# Patient Record
Sex: FEMALE | Race: ASIAN | NOT HISPANIC OR LATINO | Employment: PART TIME
[De-identification: names, ages, dates, MRNs, and addresses within clinical notes are randomized per-mention and may not be internally consistent; named-entity substitution may affect disease eponyms.]

---
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
## 2022-05-10 ENCOUNTER — APPOINTMENT (OUTPATIENT)
Dept: ULTRASOUND IMAGING | Facility: CLINIC | Age: 22
End: 2022-05-10
Attending: EMERGENCY MEDICINE
Payer: MEDICAID

## 2022-05-10 ENCOUNTER — HOSPITAL ENCOUNTER (EMERGENCY)
Facility: CLINIC | Age: 22
Discharge: HOME | End: 2022-05-11
Attending: EMERGENCY MEDICINE | Admitting: EMERGENCY MEDICINE
Payer: MEDICAID

## 2022-05-10 DIAGNOSIS — O20.9 VAGINAL BLEEDING IN PREGNANCY, FIRST TRIMESTER: ICD-10-CM

## 2022-05-10 LAB
ABO/RH(D): NORMAL
ALBUMIN UR-MCNC: NEGATIVE MG/DL
ANION GAP SERPL CALCULATED.3IONS-SCNC: 12 MMOL/L (ref 5–18)
APPEARANCE UR: CLEAR
BILIRUB UR QL STRIP: NEGATIVE
BUN SERPL-MCNC: 10 MG/DL (ref 8–22)
CALCIUM SERPL-MCNC: 9.5 MG/DL (ref 8.5–10.5)
CHLORIDE BLD-SCNC: 105 MMOL/L (ref 98–107)
CO2 SERPL-SCNC: 22 MMOL/L (ref 22–31)
COLOR UR AUTO: COLORLESS
CREAT SERPL-MCNC: 0.69 MG/DL (ref 0.6–1.1)
ERYTHROCYTE [DISTWIDTH] IN BLOOD BY AUTOMATED COUNT: 12.3 % (ref 10–15)
GFR SERPL CREATININE-BSD FRML MDRD: >90 ML/MIN/1.73M2
GLUCOSE BLD-MCNC: 87 MG/DL (ref 70–125)
GLUCOSE UR STRIP-MCNC: NEGATIVE MG/DL
HCG SERPL-ACNC: 140 MLU/ML (ref 0–4)
HCT VFR BLD AUTO: 37.5 % (ref 35–47)
HGB BLD-MCNC: 12.8 G/DL (ref 11.7–15.7)
HGB UR QL STRIP: ABNORMAL
HOLD SPECIMEN: NORMAL
KETONES UR STRIP-MCNC: NEGATIVE MG/DL
LEUKOCYTE ESTERASE UR QL STRIP: NEGATIVE
MCH RBC QN AUTO: 29.4 PG (ref 26.5–33)
MCHC RBC AUTO-ENTMCNC: 34.1 G/DL (ref 31.5–36.5)
MCV RBC AUTO: 86 FL (ref 78–100)
NITRATE UR QL: NEGATIVE
PH UR STRIP: 6.5 [PH] (ref 5–7)
PLATELET # BLD AUTO: 369 10E3/UL (ref 150–450)
POTASSIUM BLD-SCNC: 3.7 MMOL/L (ref 3.5–5)
RBC # BLD AUTO: 4.35 10E6/UL (ref 3.8–5.2)
RBC URINE: 1 /HPF
SODIUM SERPL-SCNC: 139 MMOL/L (ref 136–145)
SP GR UR STRIP: 1 (ref 1–1.03)
SPECIMEN EXPIRATION DATE: NORMAL
SQUAMOUS EPITHELIAL: 1 /HPF
UROBILINOGEN UR STRIP-MCNC: <2 MG/DL
WBC # BLD AUTO: 8.1 10E3/UL (ref 4–11)
WBC URINE: <1 /HPF

## 2022-05-10 PROCEDURE — 81001 URINALYSIS AUTO W/SCOPE: CPT | Performed by: EMERGENCY MEDICINE

## 2022-05-10 PROCEDURE — 99285 EMERGENCY DEPT VISIT HI MDM: CPT | Mod: 25

## 2022-05-10 PROCEDURE — 86901 BLOOD TYPING SEROLOGIC RH(D): CPT | Performed by: EMERGENCY MEDICINE

## 2022-05-10 PROCEDURE — 76801 OB US < 14 WKS SINGLE FETUS: CPT

## 2022-05-10 PROCEDURE — 82310 ASSAY OF CALCIUM: CPT | Performed by: EMERGENCY MEDICINE

## 2022-05-10 PROCEDURE — 84702 CHORIONIC GONADOTROPIN TEST: CPT | Performed by: EMERGENCY MEDICINE

## 2022-05-10 PROCEDURE — 36415 COLL VENOUS BLD VENIPUNCTURE: CPT | Performed by: EMERGENCY MEDICINE

## 2022-05-10 PROCEDURE — 85027 COMPLETE CBC AUTOMATED: CPT | Performed by: EMERGENCY MEDICINE

## 2022-05-10 RX ORDER — PRENATAL VIT/IRON FUM/FOLIC AC 27MG-0.8MG
1 TABLET ORAL DAILY
Qty: 90 TABLET | Refills: 3 | Status: SHIPPED | OUTPATIENT
Start: 2022-05-10

## 2022-05-10 ASSESSMENT — ENCOUNTER SYMPTOMS
DIARRHEA: 0
FEVER: 0
COUGH: 0
SHORTNESS OF BREATH: 0
NAUSEA: 0
ABDOMINAL PAIN: 1
VOMITING: 0

## 2022-05-11 VITALS
RESPIRATION RATE: 16 BRPM | SYSTOLIC BLOOD PRESSURE: 105 MMHG | OXYGEN SATURATION: 99 % | WEIGHT: 145 LBS | TEMPERATURE: 98.4 F | HEART RATE: 75 BPM | DIASTOLIC BLOOD PRESSURE: 65 MMHG | BODY MASS INDEX: 24.75 KG/M2 | HEIGHT: 64 IN

## 2022-05-11 NOTE — ED TRIAGE NOTES
Pt arrives with complaints of vaginal bleeding/spotting. Happened for the last 3-4 days, noted when wiping after using the bathroom. Did have positive pregnancy test at home 4 days ago. Cramping pain in abdomen and in lower back.      Triage Assessment     Row Name 05/10/22 2056       Triage Assessment (Adult)    Airway WDL WDL       Respiratory WDL    Respiratory WDL WDL       Skin Circulation/Temperature WDL    Skin Circulation/Temperature WDL WDL       Cardiac WDL    Cardiac WDL WDL       Peripheral/Neurovascular WDL    Peripheral Neurovascular WDL WDL       Cognitive/Neuro/Behavioral WDL    Cognitive/Neuro/Behavioral WDL WDL

## 2022-05-11 NOTE — DISCHARGE INSTRUCTIONS
Call in the morning and make an appointment to follow-up with your family doctor THIS WEEK. They will need to follow your blood pregnancy level (HCG) and repeat another ultrasound to see if there is are signs for ongoing pregnancy or even ectopic pregnancy.    When you call to schedule the appointment in the morning let them know that you were seen in the ER [facility] and need to be reevaluated for possible miscarriage versus ectopic pregnancy.    Since there is some vaginal bleeding you are at risk for another miscarriage.  I recommend you avoid any sexual activity or strenuous activity at this time until seen by your OB/GYN.    Take a daily prenatal vitamin.    Return to the emergency department with worsening pain,vaginal bleeding (where you soak a pad every hour for at least 2 hours in a row), fever, or any other concerns.    Thank you for choosing [facility] Emergency Department.  It has been my pleasure caring for you today.     ~Dr. Liv MD

## 2022-05-11 NOTE — ED PROVIDER NOTES
EMERGENCY DEPARTMENT ENCOUNTER      NAME: Judith Shine  AGE: 22 year old female  YOB: 2000  MRN: [id number]  EVALUATION DATE & TIME: 5/10/2022  8:58 PM    PCP: No primary care provider on file.    ED PROVIDER: Sylvia Peck M.D.        Chief Complaint   Patient presents with     Vaginal Bleeding - Pregnant         FINAL IMPRESSION:    1. Vaginal bleeding in pregnancy, first trimester            MEDICAL DECISION MAKIN year old female with reported miscarriage in first trimester in October who presents emergency department with development of vaginal bleeding for the past couple of days.  She quantifies it as spotting and is not even going through a pad a day.  She has a little bit of lower abdominal cramping.  Urinalysis and laboratories look good.  Hemoglobin stable.  She is Rh+.  Quant is only 140.  Unclear at this time as this represents early pregnancy versus miscarriage versus ectopic pregnancy.  She is hemodynamically stable.  Abdomen is overall very soft and nonperitoneal.  Plan at this time is discharge home with close follow-up with her family doctor whom she has seen in the past for her miscarriage.  She understands importance of this.  Also discussed what to watch for and when to return to the ER and all of her questions have been answered.  Significant other present at bedside as well.      ED COURSE:  9:26 PM  I met with the patient to gather history and perform my exam. ED course and treatment discussed.    10:45 PM  Patient is Rh+.    12:10 AM  Ultrasound does not show an IUP.  Her abdomen is not peritoneal.  Quant is very low.  Unclear at this time if this just represents early pregnancy, ectopic, or miscarriage.  Patient understands the significant importance of her following up with her family doctor this week for clarification of this.  This is who she saw for her last miscarriage.  She understands what to watch for and when to return the ER.  We discussed bleeding and  pain precautions.    I do not think that this represents ACS, PE, ruptured AAA, aortic dissection, bowel obstruction, bowel ischemia, cholecystitis, pancreatitis, appendicitis, diverticulitis, kidney stone, pyelonephritis, incarcerated or strangulated hernia, ovarian torsion, PID, tubo-ovarian abscess, viscus perforation, perforated GI ulcer, or other such etiologies at this time.      COVID-19 PPE worn during patient evaluation:  Mask: n95 and homemade masks   Eye Protection: goggles   Gown: none   Hair cover: yes  Face shield: none   Patient wearing a mask: yes     At the conclusion of the encounter I discussed the results of all of the tests and the disposition. Their questions were answered. The patient (and any family present) acknowledged understanding and were agreeable with the care plan.      CONSULTANTS:  none        MEDICATIONS GIVEN IN THE EMERGENCY:  Medications - No data to display        NEW PRESCRIPTIONS STARTED AT TODAY'S ER VISIT     Medication List      Started    prenatal multivitamin w/iron 27-0.8 MG tablet  1 tablet, Oral, DAILY                CONDITION:  stable        DISPOSITION:  discharge home with significant other         =================================================================  =================================================================    HPI    Patient information was obtained from: patient    Use of Intrepreter: N/A      Judith Shine is a 22 year old female with history of miscarriage who presents to the ER with complaints of vaginal bleeding.    Patient states her last menstrual cycle was April 3.  She did a home pregnancy test and estimates herself to be about 5 weeks pregnant.  About 2-3 days ago she started having kim vaginal spotting.  She does not even have to change her pad in 24 hours as the spotting is very light and dark brown in color.  She does have a little bit of suprapubic discomfort.    She reports that she had a miscarriage back in October about the  "same time in pregnancy, otherwise has had no other pregnancies.  Denies any history of STDs.  Otherwise no fevers, cough, chest pain, shortness of breath, vomiting, diarrhea, dysuria, hematuria.    She denies any daily medications.      REVIEW OF SYSTEMS  Review of Systems   Constitutional: Negative for fever.   Respiratory: Negative for cough and shortness of breath.    Cardiovascular: Negative for chest pain.   Gastrointestinal: Positive for abdominal pain (suprapubic crampin). Negative for diarrhea, nausea and vomiting.   Genitourinary: Positive for vaginal bleeding.   Allergic/Immunologic: Negative for immunocompromised state.   All other systems reviewed and are negative.        PAST MEDICAL HISTORY:  Past Medical History:   Diagnosis Date     Miscarriage          PAST SURGICAL HISTORY:  No past surgical history on file.      CURRENT MEDICATIONS:    Prior to Admission medications    Not on File         ALLERGIES:  No Known Allergies      FAMILY HISTORY:  No family history on file.      SOCIAL HISTORY:  Social History     Socioeconomic History     Marital status: Single   Tobacco Use     Smoking status: Unknown If Ever Smoked         VITALS:  Patient Vitals for the past 24 hrs:   BP Temp Pulse Resp SpO2 Height Weight   05/10/22 2315 105/64 -- 71 -- 100 % -- --   05/10/22 2300 100/58 -- 74 -- 100 % -- --   05/10/22 2055 133/84 98.5  F (36.9  C) 76 18 98 % 1.626 m (5' 4\") 65.8 kg (145 lb)       Wt Readings from Last 3 Encounters:   05/10/22 65.8 kg (145 lb)   07/30/19 59 kg (130 lb) (55 %, Z= 0.12)*     * Growth percentiles are based on CDC (Girls, 2-20 Years) data.         PHYSICAL EXAM    Constitutional:  Well developed, Well nourished, NAD, GCS 15  HENT:  Normocephalic, Atraumatic, Bilateral external ears normal, Nose normal. Neck- Supple, No stridor.   Eyes:  PERRL, EOMI, Conjunctiva normal, No discharge.  Respiratory:  Normal breath sounds, No respiratory distress, No wheezing, Speaks full sentences easily. " No cough.  Cardiovascular:  Normal heart rate, Regular rhythm, No rubs, No gallops.   GI:  No excessive obesity.  Bowel sounds normal, Soft, mild suprapubic tenderness, No masses, No flank tenderness. No rebound or guarding.   : deferred    Musculoskeletal: No cyanosis, No clubbing. Good range of motion in all major joints. No major deformities noted.   Integument:  Warm, Dry, No erythema, No rash.  No petechiae.   Neurologic:  Alert & oriented x 3, No focal deficits noted. Normal gait.   Psychiatric:  Affect normal, Cooperative         LAB:  All pertinent labs reviewed and interpreted.  Recent Results (from the past 24 hour(s))   CBC (+ platelets, no diff)    Collection Time: 05/10/22  9:55 PM   Result Value Ref Range    WBC Count 8.1 4.0 - 11.0 10e3/uL    RBC Count 4.35 3.80 - 5.20 10e6/uL    Hemoglobin 12.8 11.7 - 15.7 g/dL    Hematocrit 37.5 35.0 - 47.0 %    MCV 86 78 - 100 fL    MCH 29.4 26.5 - 33.0 pg    MCHC 34.1 31.5 - 36.5 g/dL    RDW 12.3 10.0 - 15.0 %    Platelet Count 369 150 - 450 10e3/uL   Basic metabolic panel    Collection Time: 05/10/22  9:55 PM   Result Value Ref Range    Sodium 139 136 - 145 mmol/L    Potassium 3.7 3.5 - 5.0 mmol/L    Chloride 105 98 - 107 mmol/L    Carbon Dioxide (CO2) 22 22 - 31 mmol/L    Anion Gap 12 5 - 18 mmol/L    Urea Nitrogen 10 8 - 22 mg/dL    Creatinine 0.69 0.60 - 1.10 mg/dL    Calcium 9.5 8.5 - 10.5 mg/dL    Glucose 87 70 - 125 mg/dL    GFR Estimate >90 >60 mL/min/1.73m2   ABO and Rh    Collection Time: 05/10/22  9:55 PM   Result Value Ref Range    ABO/RH(D) B POS     SPECIMEN EXPIRATION DATE [id number]    HCG quantitative pregnancy (blood)    Collection Time: 05/10/22  9:55 PM   Result Value Ref Range    hCG Quantitative 140 (H) 0 - 4 mlU/mL   UA with Microscopic reflex to Culture    Collection Time: 05/10/22  9:55 PM    Specimen: Urine, Midstream   Result Value Ref Range    Color Urine Colorless Colorless, Straw, Light Yellow, Yellow    Appearance Urine Clear  Clear    Glucose Urine Negative Negative mg/dL    Bilirubin Urine Negative Negative    Ketones Urine Negative Negative mg/dL    Specific Gravity Urine 1.004 1.001 - 1.030    Blood Urine 0.1 mg/dL (A) Negative    pH Urine 6.5 5.0 - 7.0    Protein Albumin Urine Negative Negative mg/dL    Urobilinogen Urine <2.0 <2.0 mg/dL    Nitrite Urine Negative Negative    Leukocyte Esterase Urine Negative Negative    RBC Urine 1 <=2 /HPF    WBC Urine <1 <=5 /HPF    Squamous Epithelials Urine 1 <=1 /HPF   Extra Red Top Tube    Collection Time: 05/10/22  9:55 PM   Result Value Ref Range    Hold Specimen JIC    Extra Green Top (Lithium Heparin) Tube    Collection Time: 05/10/22  9:55 PM   Result Value Ref Range    Hold Specimen JIC    Extra Purple Top Tube    Collection Time: 05/10/22  9:55 PM   Result Value Ref Range    Hold Specimen JIC        Lab Results   Component Value Date    ABORH B POS 05/10/2022           RADIOLOGY:  Reviewed all pertinent imaging. Please see official radiology report.    OB  US 1st trimester w transvag   Final Result   IMPRESSION:   1. No IUP. The endometrium measures 1.5 cm. Short-term follow-up recommended to assure intrauterine gestational development.            EKG:    None      PROCEDURES:  none        Sylvia Peck M.D. [facility]  Emergency Medicine and Medical Toxicology  [facility] EMERGENCY ROOM  [address] [id number] [phone]  Dept: [phone]           Sylvia Peck MD  05/11/22 0012

## 2022-07-02 ENCOUNTER — HOSPITAL ENCOUNTER (EMERGENCY)
Facility: CLINIC | Age: 22
Discharge: HOME | End: 2022-07-02
Attending: EMERGENCY MEDICINE | Admitting: EMERGENCY MEDICINE
Payer: COMMERCIAL

## 2022-07-02 ENCOUNTER — APPOINTMENT (OUTPATIENT)
Dept: ULTRASOUND IMAGING | Facility: CLINIC | Age: 22
End: 2022-07-02
Attending: EMERGENCY MEDICINE
Payer: COMMERCIAL

## 2022-07-02 VITALS
OXYGEN SATURATION: 98 % | TEMPERATURE: 97.6 F | RESPIRATION RATE: 16 BRPM | BODY MASS INDEX: 24.89 KG/M2 | HEART RATE: 73 BPM | SYSTOLIC BLOOD PRESSURE: 106 MMHG | WEIGHT: 145 LBS | DIASTOLIC BLOOD PRESSURE: 68 MMHG
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
## 2022-07-02 DIAGNOSIS — Z34.91 FIRST TRIMESTER PREGNANCY: ICD-10-CM

## 2022-07-02 DIAGNOSIS — M54.50 ACUTE BILATERAL LOW BACK PAIN WITHOUT SCIATICA: ICD-10-CM

## 2022-07-02 PROCEDURE — 99284 EMERGENCY DEPT VISIT MOD MDM: CPT | Mod: 25

## 2022-07-02 PROCEDURE — 76801 OB US < 14 WKS SINGLE FETUS: CPT

## 2022-07-02 ASSESSMENT — ENCOUNTER SYMPTOMS
NUMBNESS: 0
CHILLS: 0
HEADACHES: 0
FATIGUE: 0
SHORTNESS OF BREATH: 0
WEAKNESS: 0
COUGH: 0
VOMITING: 0
ABDOMINAL PAIN: 1
DYSURIA: 0
FEVER: 0
BACK PAIN: 1

## 2022-07-02 NOTE — DISCHARGE INSTRUCTIONS
Your ultrasound is reassuring  No signs of bleeding  Cesar back, may continue Tylenol and ice  Rest  Follow-up with your doctor if increasing pain or pain that goes down your legs

## 2022-07-02 NOTE — ED TRIAGE NOTES
Patient is here with a fall down two steps. She landed on her lower back. She complains of lower back pain. She is 11 weeks pregnant, she reports her lower abdomen is tender as well. She is anxious as she has a miscarriage in 2021.

## 2022-07-02 NOTE — ED PROVIDER NOTES
EMERGENCY DEPARTMENT ENCOUNTER      NAME: Judith Shine  AGE: 22 year old female  YOB: 2000  MRN: [id number]  EVALUATION DATE & TIME: 7/2/2022  4:17 PM    PCP: System, Provider Not In    ED PROVIDER: Sharron Montgomery DO      Chief Complaint   Patient presents with     Abdominal Pain     Fall     Back Pain         FINAL IMPRESSION:  1. Fall, initial encounter    2. Acute bilateral low back pain without sciatica    3. First trimester pregnancy          ED COURSE & MEDICAL DECISION MAKING:    Pertinent Labs & Imaging studies reviewed. (See chart for details)    4:22 PM I met the patient and performed my initial interview and exam.  4:36 PM Reevaluated patient. Patient was seen by [facility] yesterday, 7/1, where they reassured the pregnancy.   5:08 PM Updated patient. We discussed the plan for discharge and the patient is agreeable. Reviewed supportive cares, symptomatic treatment, outpatient follow up, and reasons to return to the Emergency Department. Patient to be discharged by ED RN.     22 year old female presents to the Emergency Department for evaluation of back pain and lower abdominal pain after a fall.  She reports she slipped down 2 steps and landed on her back.  She notes diffuse low back pain since then and pain to her left lower quadrant.  She not fall to her abdomen.  She is 11 weeks gestation.  She saw OB yesterday.  Only ultrasound in our records from May does not identify an IUP, however she reassures me that an IUP was seen in her clinic visit at [facility], however I am not able to see his records today.  No midline lumbar tenderness.  No pain in her legs.  No incontinence.  She has a normal gait.  My suspicion for fracture or cauda equina are very low.  Deferred x-ray today.  She has mild left lower quadrant pain on exam.  No vaginal bleeding.  She is very anxious about her pregnancy.  Ultrasound obtained which showed an 11-week 6-day IUP, placenta is forming without evidence of hemorrhage.   Discussed reassuring findings, I feel low suspicion of miscarriage to this fall given she not land on her abdomen.  No urinary symptoms.  Encouraged Tylenol, ice to low back.  Close follow-up with her primary provider.    At the conclusion of the encounter I discussed the results of all of the tests and the disposition. The questions were answered. The patient or family acknowledged understanding and was agreeable with the care plan.       MEDICATIONS GIVEN IN THE EMERGENCY:  Medications - No data to display    NEW PRESCRIPTIONS STARTED AT TODAY'S ER VISIT  Discharge Medication List as of 7/2/2022  5:09 PM             =================================================================    HPI    Patient information was obtained from: Patient    Use of : N/A         Judith Shine is a 22 year old female with a noncontributory past medical history who presents to this ED via private auto, accompanied by boyfriend, for evaluation of left lower abdominal pain.    Patient is currently 11 weeks pregnant and after falling down the stairs at 3:45 PM today she developed left lower quadrant pain. Patient reports she only fell down two steps, slipping backwards and landing on her low back and buttocks. Following the incident, she immediately experienced low back pain to the bilateral sides described as achy in nature. Later, she developed some abdominal cramping localized to the left lower quadrant. Symptoms have neither worsened nor improved since onset, and she has not taken any medications for relief of symptoms. Patient denies any associated pain, numbness, or tingling in her legs.     Patient has a 1x history of miscarriage. Due to the fall, she is primarily concerned about the safety of her current pregnancy. Patient denies any other complaints at this time.     REVIEW OF SYSTEMS   Review of Systems   Constitutional: Negative for chills, fatigue and fever.   Respiratory: Negative for cough and shortness of breath.     Cardiovascular: Negative for chest pain.   Gastrointestinal: Positive for abdominal pain (LLQ). Negative for vomiting.   Genitourinary: Negative for dysuria.   Musculoskeletal: Positive for back pain (low, bilateral).        Negative for pain in legs   Skin: Negative.    Neurological: Negative for syncope, weakness, numbness and headaches.   All other systems reviewed and are negative.     PAST MEDICAL HISTORY:  Past Medical History:   Diagnosis Date     Miscarriage        PAST SURGICAL HISTORY:  No past surgical history on file.        CURRENT MEDICATIONS:    Prenatal Vit-Fe Fumarate-FA (PRENATAL MULTIVITAMIN W/IRON) 27-0.8 MG tablet         ALLERGIES:  No Known Allergies    FAMILY HISTORY:  No family history on file.    SOCIAL HISTORY:   Social History     Socioeconomic History     Marital status: Single   Tobacco Use     Smoking status: Unknown If Ever Smoked       VITALS:  /68   Pulse 73   Temp 97.6  F (36.4  C) (Temporal)   Resp 16   Wt 65.8 kg (145 lb)   LMP 04/03/2022   SpO2 98%   BMI 24.89 kg/m      PHYSICAL EXAM    Physical Exam  Constitutional:       General: She is not in acute distress.  HENT:      Head: Normocephalic and atraumatic.      Mouth/Throat:      Pharynx: Oropharynx is clear.   Eyes:      Pupils: Pupils are equal, round, and reactive to light.   Cardiovascular:      Rate and Rhythm: Normal rate and regular rhythm.      Pulses: Normal pulses.      Heart sounds: Normal heart sounds.   Pulmonary:      Effort: Pulmonary effort is normal.      Breath sounds: Normal breath sounds.   Abdominal:      General: Abdomen is flat. Bowel sounds are normal.      Palpations: Abdomen is soft.      Tenderness: There is abdominal tenderness in the left lower quadrant.   Musculoskeletal:         General: Normal range of motion.      Lumbar back: Tenderness (above bilateral SI joints) present. No bony tenderness.   Skin:     General: Skin is warm and dry.      Capillary Refill: Capillary refill  takes less than 2 seconds.   Neurological:      General: No focal deficit present.      Mental Status: She is alert and oriented to person, place, and time.      Sensory: Sensation is intact.      Motor: Motor function is intact.      Gait: Gait is intact.        LAB:  All pertinent labs reviewed and interpreted.  Labs Ordered and Resulted from Time of ED Arrival to Time of ED Departure - No data to display    RADIOLOGY:  Reviewed all pertinent imaging. Please see official radiology report.  US OB < 14 Weeks Single   Final Result   IMPRESSION:    1. Single, living intrauterine gestation measuring 11 weeks and 6 days. There has been appropriate interval growth (EDC from the earlier study is a 01/16/2023).    2. Placenta is developing anteriorly. No subplacental hemorrhage.             I, Brenda Leach, am serving as a scribe to document services personally performed by Dr. Sharron Montgomery based on my observation and the provider's statements to me. ISharron, DO attest that Brenda Leach is acting in a scribe capacity, has observed my performance of the services and has documented them in accordance with my direction.    Sharron Montgomery DO  Emergency Medicine  [facility] EMERGENCY ROOM  [address] [id number] [phone]  Dept: [phone]     Sharron Montgomery DO  07/02/22 1914

## 2022-07-24 ENCOUNTER — HEALTH MAINTENANCE LETTER (OUTPATIENT)
Age: 22
End: 2022-07-24

## 2022-10-02 ENCOUNTER — HEALTH MAINTENANCE LETTER (OUTPATIENT)
Age: 22
End: 2022-10-02

## 2023-08-12 ENCOUNTER — HEALTH MAINTENANCE LETTER (OUTPATIENT)
Age: 23
End: 2023-08-12

## 2024-10-05 ENCOUNTER — HEALTH MAINTENANCE LETTER (OUTPATIENT)
Age: 24
End: 2024-10-05